# Patient Record
Sex: MALE | URBAN - METROPOLITAN AREA
[De-identification: names, ages, dates, MRNs, and addresses within clinical notes are randomized per-mention and may not be internally consistent; named-entity substitution may affect disease eponyms.]

---

## 2024-08-09 ENCOUNTER — TELEPHONE (OUTPATIENT)
Age: 74
End: 2024-08-09

## 2024-08-09 NOTE — TELEPHONE ENCOUNTER
Referral for screening colonoscopy and other fecal abnormalities. Please review and advise.     Sent message to referring provider requesting [facility] authorization before any procedure is to be scheduled. Please check referral for authorization before scheduling.     Referral  Referral # [id number]  Referral Information    Referral # Creation Date Referral Status Status Update    [id number] 08/09/2024 Pending Review 08/09/2024: Status History     Status Reason Referral Type Referral Reasons Referral Class   Other Eval and Treat none Incoming     To Specialty To Provider To Location/Place of Service To Department   Gastroenterology Curtis Vaca MD none [facility] GASTROENTEROLOGY     To Vendor Referred By By Location/Place of Service By Department   none Dena Lundy, NP-C none none     Priority Start Date Expiration Date Referral Entered By   Routine 08/02/2024 08/02/2025 Martínez Aquino     Visits Requested Visits Authorized Visits Completed Visits Scheduled   2 2       Coverages    Payor Plan Auth. Required? Covered? Member # Authorized From Expires Auth # Precert. # Comment   [employer] VACCN [employer]UM -- Covered [id number] 4/11/2024 -- -- -- --     Referral Information    Referral # Creation Date Referral Status Status Update    [id number] 08/09/2024 Pending Review 08/09/2024: Status History     Status Reason Referral Type Referral Reasons Referral Class   Other Eval and Treat none Incoming     To Specialty To Provider To Location/Place of Service To Department   Gastroenterology Curtis Vaca MD none [facility] GASTROENTEROLOGY     To Vendor Referred By By Location/Place of Service By Department   none Dena Lundy, NP-C none none     Priority Start Date Expiration Date Referral Entered By   Routine 08/02/2024 08/02/2025 Martínez Aquino     Visits Requested Visits Authorized Visits Completed Visits Scheduled   2 2       Procedure Information    Service